# Patient Record
(demographics unavailable — no encounter records)

---

## 2025-01-22 NOTE — DISCUSSION/SUMMARY
[Patient] : the patient [___ Month(s)] : in [unfilled] month(s) [FreeTextEntry1] : 77 y/o male with h/o ao aneurysm, htn, hl, cirrhosis, cad, pancreatic ca s/p stent/whipple, prostate ca s/p prostatectomy who presents for f/up today  -EP referral for possible svt -Calcium score 5/24: Cardiac: 1. The calcium score is moderate at 265 Agatston units, which is at the 49 percentile, adjusted for age, gender and race. 2. Aortic and mitral annular calcification. Non-cardiac: Mild aneurysmal dilatation of the ascending aorta   -Echo 4/24: 1. Left ventricular cavity is normal in size. Left ventricular wall thickness is normal. Left ventricular systolic function is normal with an ejection fraction of 62 % by Way's method of disks. There are no regional wall motion abnormalities seen. 2. Normal left ventricular diastolic function. 3. Normal right ventricular cavity size, with normal wall thickness, and normal systolic function. 4. Aortic root at the sinuses of Valsalva is dilated, measuring 4.00 cm (indexed 2.23 cm/m). Ascending aorta diameter is dilated, measuring 3.83 cm (indexed 2.14 cm/m). 5. Trace tricuspid regurgitation. 6. No pericardial effusion seen. 7. No significant valvular disease.  -CT PE 2022: HEART/VASCULATURE: Mild coronary artery calcification. Normal heart size. No pericardial effusion. Mild aneurysmal dilatation of aortic root 4.3 cm and ascending aorta 4 cm. -ekg 9/24 - nsr, normal intervals, no st/t changes -refer to Dr. Schwartz for aneurysm -continue asa -on repatha  -unable to tolerate toprol for aneurysm -counseled on cvd risk factors -f/up 3 months for cvd risk factors, lipids, LpA  I have spent 30 minutes reviewing labs, records, tests and discussed cvd risk factors, cad, ao aneurysm.

## 2025-01-22 NOTE — HISTORY OF PRESENT ILLNESS
[FreeTextEntry1] : 77 y/o male with h/o ao aneurysm, htn, hl, cirrhosis, cad, pancreatic ca s/p stent/whipple, prostate ca s/p prostatectomy who presents for f/up today  last seen   was taking zetia - dc'd a few weeks started repatha 2 weeks ago   no cp no sob, maloney no syncope, lh, edema, orthponea, pnd    noted was in [place]  - played table tennis heart rate stayed in 120-125 range for an hour - saw this on watch (did not say afib on watch) sent to ER in [place] - reportedly normal ekg  noted retinal detachment right eye that same trip in [place] - had operation w 3 day stay  says has had elevated HR in past - but usually was able to take measures to slow it down - only lasted few minutes states he feels triggered by stress    returned from [place] 10 days ago     tried toprol - unable to tolerate  was on zetia 5 mg qhs  referred to EP - has not seen yet referred to [facility] for aneurysm - has not seen yet   -Calcium score : Cardiac: 1. The calcium score is moderate at 265 Agatston units, which is at the 49 percentile, adjusted for age, gender and race. 2. Aortic and mitral annular calcification. Non-cardiac: Mild aneurysmal dilatation of the ascending aorta  -Echo : 1. Left ventricular cavity is normal in size. Left ventricular wall thickness is normal. Left ventricular systolic function is normal with an ejection fraction of 62 % by Way's method of disks. There are no regional wall motion abnormalities seen. 2. Normal left ventricular diastolic function. 3. Normal right ventricular cavity size, with normal wall thickness, and normal systolic function. 4. Aortic root at the sinuses of Valsalva is dilated, measuring 4.00 cm (indexed 2.23 cm/m). Ascending aorta diameter is dilated, measuring 3.83 cm (indexed 2.14 cm/m). 5. Trace tricuspid regurgitation. 6. No pericardial effusion seen. 7. No significant valvular disease.     was on losartan in past  CT PE  HEART/VASCULATURE: Mild coronary artery calcification. Normal heart size. No pericardial effusion. Mild aneurysmal dilatation of aortic root 4.3 cm and ascending aorta 4 cm.   failed 3 statins - lipitor/crestor/zocor - fatigue, depression  active, plays table tennis mediterranean diet no mental health issues stress high sleep  PMH/PSH: cad meniere's ao aneurysm htn hearing loss cirrhosis gilberts gout sbo pancreatic ca s/p stent/whipple 2017 knee surgery hl inguinal hernia pulm nodule anxiety/depression arthritis tendon rupture BCC gerd pna prostate ca s/p prostatectomy  s/p appy s/p eye surgery s/p josr s/p bilateral shoulder surgery   ALL: cipro, flagyl, statins avelox, ketorolac  MEDS: pepcid zenpep Vit D2 asa 81 mg qd repatha  SH: no tobacco h/o heavy etoh past - no etoh now no drugs retired former  [employer]  no children from [place]  FH: mother -  82, h/o frontotemporal dementia father -  74, chf sister - alive, cva 65, 69 sister - ppm, alive 72

## 2025-02-26 NOTE — HISTORY OF PRESENT ILLNESS
[FreeTextEntry1] : 77 yo hx pancreatic CA s/p Whipple procedure , prostate CA s/p prostatectomy , inguinal hernia repair , > 4cm aortic aneurysm, R cochlear Meniere's Disease, HTN, HLD, Gout, bcc, and fatty liver discovered on imaging , referred by Dr. Valero, PCP for fatty liver and elevated LFTs. Fibroscan 23 F4, S3. f/u MRE 23: cirrhosis and severe steatosis. No evidence of hepatoma.  24 had detached retina in [place] in July, seeing Optho stopped reptha due to this , had read about optical issues assoc with the medicine notes he had "2 obstructive bowels" this year - had NG decompression - avoided surgery both time, related to ?adhesions ; notes the zenpep can be assoc with obstruction and gout, and he had episodes in the past  25: Since last visit, he has been doing well. Been eating well and exercise well. Does report he had the flu last week. But denies abd pain, N/V any more. Last week he did, but not his normal. last blood test was  it has gone up slowly from 35 -> 42 -> 48 . He had an US done which showed NO discrete lesions. Last colonoscopy was last year, polyps removed. Denies weight loss, changes to bowel habits. Does report he gets full quicker now since he stopped drinking.   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 LYMPH NODES: No thoracic lymphadenopathy. HEART/VASCULATURE: Mild coronary artery calcification. Normal heart size. No pericardial effusion. Mild aneurysmal dilatation of aortic root 4.3 cm and ascending aorta 4 cm. AIRWAYS/LUNGS/PLEURA: The airways are patent. No consolidations. Since  2017, no change in multiple pulmonary nodules including 6 mm in the right lower lobe 8 mm subpleural nodule right middle lobe and 8 mm subpleural nodule left upper lobe. No pleural effusions.  UPPER ABDOMEN: Hepatic steatosis. S/p gastric bypass. Status post pancreatic stent placement. BONES/SOFT TISSUES: Degenerative changes of the spine. Small hemangioma T11 vertebral body.  IMPRESSION: No pulmonary embolus. Mild aneurysmal dilatation of aortic root 4.3 cm and ascending aorta 4 cm.  ---prior history--- denies abdl pain, NV, f/c, CP, f/c appetite pretty good. Pt has been doing a low carb diet. Pt has cut down on wine intake to 1/2 a glass daily. BM daily, incomplete digestion, bm's mostly watery alt with soft, sometimes pale, mostly brown, denies rectal blood. walks

## 2025-02-26 NOTE — HISTORY OF PRESENT ILLNESS
[FreeTextEntry1] : 75 yo hx pancreatic CA s/p Whipple procedure , prostate CA s/p prostatectomy , inguinal hernia repair , > 4cm aortic aneurysm, R cochlear Meniere's Disease, HTN, HLD, Gout, bcc, and fatty liver discovered on imaging , referred by Dr. Valero, PCP for fatty liver and elevated LFTs. Fibroscan 23 F4, S3. f/u MRE 23: cirrhosis and severe steatosis. No evidence of hepatoma.  24 had detached retina in [place] in July, seeing Optho stopped reptha due to this , had read about optical issues assoc with the medicine notes he had "2 obstructive bowels" this year - had NG decompression - avoided surgery both time, related to ?adhesions ; notes the zenpep can be assoc with obstruction and gout, and he had episodes in the past  25: Since last visit, he has been doing well. Been eating well and exercise well. Does report he had the flu last week. But denies abd pain, N/V any more. Last week he did, but not his normal. last blood test was  it has gone up slowly from 35 -> 42 -> 48 . He had an US done which showed NO discrete lesions. Last colonoscopy was last year, polyps removed. Denies weight loss, changes to bowel habits. Does report he gets full quicker now since he stopped drinking.   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  Labs: 2024 - CBC - WBC 3.27, HgB 13.5, Plt 165 2024 - CMP - Na 137, K 4.2, Cl 98  6/15/23 (2022) AST 49 (54) ALT 53 (66) TB 1.7 (1.9) SCr 0.8 Hg 14.3   STUDIES CT ANGIO CHEST PULM ART [facility] 2022 FINDINGS: PULMONARY ANGIOGRAM: No pulmonary embolus. LYMPH NODES: No thoracic lymphadenopathy. HEART/VASCULATURE: Mild coronary artery calcification. Normal heart size. No pericardial effusion. Mild aneurysmal dilatation of aortic root 4.3 cm and ascending aorta 4 cm. AIRWAYS/LUNGS/PLEURA: The airways are patent. No consolidations. Since  2017, no change in multiple pulmonary nodules including 6 mm in the right lower lobe 8 mm subpleural nodule right middle lobe and 8 mm subpleural nodule left upper lobe. No pleural effusions.  UPPER ABDOMEN: Hepatic steatosis. S/p gastric bypass. Status post pancreatic stent placement. BONES/SOFT TISSUES: Degenerative changes of the spine. Small hemangioma T11 vertebral body.  IMPRESSION: No pulmonary embolus. Mild aneurysmal dilatation of aortic root 4.3 cm and ascending aorta 4 cm.  ---prior history--- denies abdl pain, NV, f/c, CP, f/c appetite pretty good. Pt has been doing a low carb diet. Pt has cut down on wine intake to 1/2 a glass daily. BM daily, incomplete digestion, bm's mostly watery alt with soft, sometimes pale, mostly brown, denies rectal blood. walks

## 2025-02-26 NOTE — ASSESSMENT
[FreeTextEntry1] : 77 yo hx pancreatic CA s/p Whipple procedure 2017, prostate CA s/p prostatectomy 2013, inguinal hernia repair 1977, > 4cm aortic aneurysm, R cochlear Meniere's Disease, HTN, HLD, Gout, bcc, initially referred for fatty liver discovered on imaging 2022, which led to diagnosis of Cirrhosis, suspect MASH / EtOH / Met-ALD  #Cirrhosis, MASH vs. MET-ALD vs. AARLD, MELD 3.0: 7 4/2024 - mr 5/2024 without HCC, US 2/25 neg HCC, AFP negative 8/2024 - repeat blood work today  - hcc screening  - low meld and well compensated - -HCC, -HE, ?EV, -SBP, -PVT, - Plan to repeat Fibroscan next visit, last one was 2023   #Hepatic duct Stent #Pancreatic Ca s/p Whipple #Elevated CA 19-9 - it appears stent has remained in place for 7 years, and after discussion with surgery team, this is acceptable for time being - Dilation of pancreatic duct mildly dilated measuring 0.5 cm - Repeat 19-9, if elevated further and MR negative, consider PET scan, or if changes in PD or increase in ca19-9 consider EGD/EUS to look for any obstructing lesion or new lesions   #Recurrent SBO - Followed by Dr. Nguyen in office, managed expectantly  RTC 3 months  Seen and dw Dr. Page.  Obey Mohamud DO, PhD Gastroenterology Fellow [facility]

## 2025-02-26 NOTE — ASSESSMENT
[FreeTextEntry1] : 77 yo hx pancreatic CA s/p Whipple procedure 2017, prostate CA s/p prostatectomy 2013, inguinal hernia repair 1977, > 4cm aortic aneurysm, R cochlear Meniere's Disease, HTN, HLD, Gout, bcc, initially referred for fatty liver discovered on imaging 2022, which led to diagnosis of Cirrhosis, suspect MASH / EtOH / Met-ALD  #Cirrhosis, MASH vs. MET-ALD vs. AARLD, MELD 3.0: 7 4/2024 - mr 5/2024 without HCC, US 2/25 neg HCC, AFP negative 8/2024 - repeat blood work today  - hcc screening  - low meld and well compensated - -HCC, -HE, ?EV, -SBP, -PVT, - Plan to repeat Fibroscan next visit, last one was 2023   #Hepatic duct Stent #Pancreatic Ca s/p Whipple #Elevated CA 19-9 - it appears stent has remained in place for 7 years, and after discussion with surgery team, this is acceptable for time being - Dilation of pancreatic duct mildly dilated measuring 0.5 cm - Repeat 19-9, if elevated further and MR negative, consider PET scan, or if changes in PD or increase in ca19-9 consider EGD/EUS to look for any obstructing lesion or new lesions   #Recurrent SBO - Followed by Dr. Nguyen in office, managed expectantly  RTC 3 months  Seen and dw Dr. Page.  Obey Mohamud DO, PhD Gastroenterology Fellow [facility]

## 2025-02-26 NOTE — PHYSICAL EXAM
[General Appearance - Alert] : alert [General Appearance - Well-Appearing] : healthy appearing [Sclera] : the sclera and conjunctiva were normal [Examination Of The Oral Cavity] : the lips and gums were normal [Neck Appearance] : the appearance of the neck was normal [Heart Rate And Rhythm] : heart rate was normal and rhythm regular [Edema] : there was no peripheral edema [Abnormal Walk] : normal gait [Skin Color & Pigmentation] : normal skin color and pigmentation [] : no rash [No Focal Deficits] : no focal deficits [Oriented To Time, Place, And Person] : oriented to person, place, and time [Abdomen Soft] : soft [Abdomen Tenderness] : non-tender [Abdomen Mass (___ Cm)] : no abdominal mass palpated [FreeTextEntry1] : prior scar noted, well healed

## 2025-03-04 NOTE — HISTORY OF PRESENT ILLNESS
[FreeTextEntry1] : 77 y/o male with h/o htn, hl, cirrhosis, cad, pancreatic ca s/p stent/whipple, prostate ca s/p prostatectomy, who presents for initial evaluation and management of thoracic aortic aneurysm. Patient is referred by Dr. Martha Duckworth.   CT ANGIO CHEST AORTA 02/10/2025 Since July 9, 2022, unchanged mild aneurysmal dilatation aortic root (4.0 cm).  TTE 04/10/2024 1. Left ventricular cavity is normal in size. Left ventricular wall thickness is normal. Left ventricular systolic function is normal with an ejection fraction of 62 % by Way's method of disks. There are no regional wall motion abnormalities seen. 2. Normal left ventricular diastolic function. 3. Normal right ventricular cavity size, with normal wall thickness, and normal systolic function. 4. Aortic root at the sinuses of Valsalva is dilated, measuring 4.00 cm (indexed 2.23 cm/m). Ascending aorta diameter is dilated, measuring 3.83 cm (indexed 2.14 cm/m). 5. Trace tricuspid regurgitation. 6. No pericardial effusion seen. 7. No significant valvular disease.

## 2025-03-04 NOTE — ASSESSMENT
[FreeTextEntry1] :   The patient's medical records and diagnostic images were reviewed at the time of this office visit, and the following recommendation was made. CT and TTE were completed of the thoracic aorta. The report was reviewed and noted in the chart, I independently reviewed and interpreted images and reports, and I reviewed the films/CD and agree. Patient does not meet criteria for surgical intervention at the time and will be entered into the aortic registry surveillance program.  Plan  - [facility] with CTA CHEST. - Continue medication regimen. - Follow up with cardiologist and PCP. - Blood pressure management. - Discussed signs and symptoms that warrant emergency medical attention.

## 2025-03-04 NOTE — HISTORY OF PRESENT ILLNESS
[FreeTextEntry1] : 75 y/o male with h/o htn, hl, cirrhosis, cad, pancreatic ca s/p stent/whipple, prostate ca s/p prostatectomy, who presents for initial evaluation and management of thoracic aortic aneurysm. Patient is referred by Dr. Martha Duckworth.   CT ANGIO CHEST AORTA 02/10/2025 Since July 9, 2022, unchanged mild aneurysmal dilatation aortic root (4.0 cm).  TTE 04/10/2024 1. Left ventricular cavity is normal in size. Left ventricular wall thickness is normal. Left ventricular systolic function is normal with an ejection fraction of 62 % by Way's method of disks. There are no regional wall motion abnormalities seen. 2. Normal left ventricular diastolic function. 3. Normal right ventricular cavity size, with normal wall thickness, and normal systolic function. 4. Aortic root at the sinuses of Valsalva is dilated, measuring 4.00 cm (indexed 2.23 cm/m). Ascending aorta diameter is dilated, measuring 3.83 cm (indexed 2.14 cm/m). 5. Trace tricuspid regurgitation. 6. No pericardial effusion seen. 7. No significant valvular disease.

## 2025-03-04 NOTE — END OF VISIT
[FreeTextEntry3] : I, JEREMIE Humphreys , personally performed the evaluation and management (E/M) services for this new patient.  That E/M includes conducting the clinically appropriate initial history &/or exam, assessing all conditions, and establishing the plan of care.  Today, my JEFFERSON, was here to observe &/or participate in the visit & follow plan of care established by me.

## 2025-03-04 NOTE — PROCEDURE
[FreeTextEntry1] : Dr. Schwartz reviewed the indications for surgery, and used our webpage [email or web address] <[email or web address]> to illustrate the aorta and anatomy of the heart. Those indications are the following: size greater than 5.0 cm, symptomatic aneurysms, family history of aortic dissection or aneurysm death with a size greater than 4.5 cm, other necessary cardiac procedures such as coronary artery bypass grafting or valvular disorders with an aneurysm greater than 4.5 cm, or connective tissue disorders with an aneurysm size greater than 4.5 cm. The patient does not meet size criteria for surgical intervention at the time.  Dr. Schwartz discussed activity restrictions with the patient, and would advise exercise at a moderate amount with no heavy lifting over one third of body weight, and avoiding heart rates that exceed 140 beats per minute. In addition, every patient should abstain from tobacco abuse and to avoid all illicit drug use, especially stimulants such as cocaine or methamphetamine. Dr. Schwartz also counseled regarding maintaining a healthy heart diet, and losing any excessive weight as this also put undue stress on both the aorta and entire cardiovascular system. First degree family members should be screened for bicuspid valve disease, and ascending aortic aneurysms.   Patient was advised to view the educational video prior to this visit regarding aortic pathology, risk factors, surgical procedures, and lifestyle modifications. Video can be retrieved at [email or web address].be.

## 2025-03-04 NOTE — PROCEDURE
[FreeTextEntry1] : Dr. Schwartz reviewed the indications for surgery, and used our webpage [email or web address] <[email or web address]> to illustrate the aorta and anatomy of the heart. Those indications are the following: size greater than 5.0 cm, symptomatic aneurysms, family history of aortic dissection or aneurysm death with a size greater than 4.5 cm, other necessary cardiac procedures such as coronary artery bypass grafting or valvular disorders with an aneurysm greater than 4.5 cm, or connective tissue disorders with an aneurysm size greater than 4.5 cm. The patient does not meet size criteria for surgical intervention at the time.  Dr. Schwartz discussed activity restrictions with the patient, and would advise exercise at a moderate amount with no heavy lifting over one third of body weight, and avoiding heart rates that exceed 140 beats per minute. In addition, every patient should abstain from tobacco abuse and to avoid all illicit drug use, especially stimulants such as cocaine or methamphetamine. Dr. Schwartz also counseled regarding maintaining a healthy heart diet, and losing any excessive weight as this also put undue stress on both the aorta and entire cardiovascular system. First degree family members should be screened for bicuspid valve disease, and ascending aortic aneurysms.   Patient was advised to view the educational video prior to this visit regarding aortic pathology, risk factors, surgical procedures, and lifestyle modifications. Video can be retrieved at [email or web address].

## 2025-03-04 NOTE — DATA REVIEWED
[FreeTextEntry1] : -Calcium score 5/24: Cardiac: 1. The calcium score is moderate at 265 Agatston units, which is at the 49 percentile, adjusted for age, gender and race. 2. Aortic and mitral annular calcification. Non-cardiac: Mild aneurysmal dilatation of the ascending aorta  -Echo 4/24: 1. Left ventricular cavity is normal in size. Left ventricular wall thickness is normal. Left ventricular systolic function is normal with an ejection fraction of 62 % by Way's method of disks. There are no regional wall motion abnormalities seen. 2. Normal left ventricular diastolic function. 3. Normal right ventricular cavity size, with normal wall thickness, and normal systolic function. 4. Aortic root at the sinuses of Valsalva is dilated, measuring 4.00 cm (indexed 2.23 cm/m). Ascending aorta diameter is dilated, measuring 3.83 cm (indexed 2.14 cm/m). 5. Trace tricuspid regurgitation. 6. No pericardial effusion seen. 7. No significant valvular disease.  CT PE 2022 HEART/VASCULATURE: Mild coronary artery calcification. Normal heart size. No pericardial effusion. Mild aneurysmal dilatation of aortic root 4.3 cm and ascending aorta 4 cm.

## 2025-03-04 NOTE — PROCEDURE
[FreeTextEntry1] : Dr. Schwartz reviewed the indications for surgery, and used our webpage [email or web address] <[email or web address]> to illustrate the aorta and anatomy of the heart. Those indications are the following: size greater than 5.0 cm, symptomatic aneurysms, family history of aortic dissection or aneurysm death with a size greater than 4.5 cm, other necessary cardiac procedures such as coronary artery bypass grafting or valvular disorders with an aneurysm greater than 4.5 cm, or connective tissue disorders with an aneurysm size greater than 4.5 cm. The patient does not meet size criteria for surgical intervention at the time.  Dr. Schwartz discussed activity restrictions with the patient, and would advise exercise at a moderate amount with no heavy lifting over one third of body weight, and avoiding heart rates that exceed 140 beats per minute. In addition, every patient should abstain from tobacco abuse and to avoid all illicit drug use, especially stimulants such as cocaine or methamphetamine. Dr. Schwartz also counseled regarding maintaining a healthy heart diet, and losing any excessive weight as this also put undue stress on both the aorta and entire cardiovascular system. First degree family members should be screened for bicuspid valve disease, and ascending aortic aneurysms.   Patient was advised to view the educational video prior to this visit regarding aortic pathology, risk factors, surgical procedures, and lifestyle modifications. Video can be retrieved at [email or web address].

## 2025-04-21 NOTE — CARDIOLOGY SUMMARY
[de-identified] : 4/21/25 SB @ 58 bpm  [de-identified] : TTE 4/2024 1. Left ventricular cavity is normal in size. Left ventricular wall thickness is normal. Left ventricular systolic function is normal with an ejection fraction of 62 % by Way's method of disks. There are no regional wall motion abnormalities seen. 2. Normal left ventricular diastolic function. 3. Normal right ventricular cavity size, with normal wall thickness, and normal systolic function. 4. Aortic root at the sinuses of Valsalva is dilated, measuring 4.00 cm (indexed 2.23 cm/m). Ascending aorta diameter is dilated, measuring 3.83 cm (indexed 2.14 cm/m). 5. Trace tricuspid regurgitation. 6. No pericardial effusion seen. 7. No significant valvular disease.

## 2025-04-21 NOTE — CARDIOLOGY SUMMARY
[de-identified] : 4/21/25 SB @ 58 bpm  [de-identified] : TTE 4/2024 1. Left ventricular cavity is normal in size. Left ventricular wall thickness is normal. Left ventricular systolic function is normal with an ejection fraction of 62 % by Way's method of disks. There are no regional wall motion abnormalities seen. 2. Normal left ventricular diastolic function. 3. Normal right ventricular cavity size, with normal wall thickness, and normal systolic function. 4. Aortic root at the sinuses of Valsalva is dilated, measuring 4.00 cm (indexed 2.23 cm/m). Ascending aorta diameter is dilated, measuring 3.83 cm (indexed 2.14 cm/m). 5. Trace tricuspid regurgitation. 6. No pericardial effusion seen. 7. No significant valvular disease.

## 2025-04-21 NOTE — ADDENDUM
[FreeTextEntry1] : I, Sonia Parikh, am scribing for and the presence of Dr. Brumfield the following sections: HPI, PMH,Family/social history, ROS, Physical Exam, Assessment / Plan.   I, Audie Brumfield, personally performed the services described in the documentation, reviewed the documentation recorded by the scribe in my presence and it accurately and completely records my words and actions.

## 2025-04-21 NOTE — HISTORY OF PRESENT ILLNESS
[FreeTextEntry1] : Mr. Rolle is a pleasant 76 year old gentleman with a past medical history significant for thoracic aortic aneurysm (followed by Dr. Schwartz), HTN, HLD, Cirrhosis, CAD, Retinal detachment (2024), Pancreatic CA s/p stent/whipple, Prostate CA s/p prostatectomy who presents for evaluation of tachycardia.    He notes a longstanding history of elevated heartrate / palpitations during stressful situations.  However, he had a prolonged episode after a table tennis competition in [place] during Summer 2024.  Reports the episode lasting for ~ 2 hours with HR in the 120 bpm range based upon apple watch.  He had an EKG at an ER in [place] that showed normal rhythm by his report.   He has not received any alerts for AFib.  He has had other frequent brief episodes that start abruptly and he is able to control with meditative breathing.  Often triggered by stress.  He was an avid runner for many years- ran [place] four times.  Since then he has been very active playing competetive table tennis.

## 2025-04-21 NOTE — DISCUSSION/SUMMARY
[FreeTextEntry1] : Mr. Rolle is a pleasant 76 year old gentleman with a past medical history significant for thoracic aortic aneurysm (followed by Dr. Schwartz), HTN, HLD, Cirrhosis, CAD, Retinal detachment (2024), Pancreatic CA s/p stent/whipple, Prostate CA s/p prostatectomy who presents for evaluation of tachycardia.    1.  Tachycardia Discussed concern for SVT vs. AFib given h/o marathon training  Recommend LTM for heart rhythm assessment   F/U 6 weeks, sooner as needed

## 2025-04-21 NOTE — HISTORY OF PRESENT ILLNESS
[FreeTextEntry1] : Mr. Rolle is a pleasant 76 year old gentleman with a past medical history significant for thoracic aortic aneurysm (followed by Dr. Schwartz), HTN, HLD, Cirrhosis, CAD, Retinal detachment (2024), Pancreatic CA s/p stent/whipple, Prostate CA s/p prostatectomy who presents for evaluation of tachycardia.    He notes a longstanding history of elevated heartrate / palpitations during stressful situations.  However, he had a prolonged episode after a table tennis competition in [place] during Summer 2024.  Reports the episode lasting for ~ 2 hours with HR in the 120 bpm range based upon apple watch.  He had an EKG at an ER in [place] that showed normal rhythm by his report.   He has not received any alerts for AFib.  He has had other frequent brief episodes that start abruptly and he is able to control with meditative breathing.  Often triggered by stress.  He was an avid runner for many years- ran [place] four times.  Since then he has been very active playing competetive table tennis.

## 2025-04-21 NOTE — CARDIOLOGY SUMMARY
[de-identified] : 4/21/25 SB @ 58 bpm  [de-identified] : TTE 4/2024 1. Left ventricular cavity is normal in size. Left ventricular wall thickness is normal. Left ventricular systolic function is normal with an ejection fraction of 62 % by Way's method of disks. There are no regional wall motion abnormalities seen. 2. Normal left ventricular diastolic function. 3. Normal right ventricular cavity size, with normal wall thickness, and normal systolic function. 4. Aortic root at the sinuses of Valsalva is dilated, measuring 4.00 cm (indexed 2.23 cm/m). Ascending aorta diameter is dilated, measuring 3.83 cm (indexed 2.14 cm/m). 5. Trace tricuspid regurgitation. 6. No pericardial effusion seen. 7. No significant valvular disease.

## 2025-04-21 NOTE — HISTORY OF PRESENT ILLNESS
[FreeTextEntry1] : Mr. Rolle is a pleasant 76 year old gentleman with a past medical history significant for thoracic aortic aneurysm (followed by Dr. Schwartz), HTN, HLD, Cirrhosis, CAD, Retinal detachment (2024), Pancreatic CA s/p stent/whipple, Prostate CA s/p prostatectomy who presents for evaluation of tachycardia.    He notes a longstanding history of elevated heartrate / palpitations during stressful situations.  However, he had a prolonged episode after a table tennis competition in [place] during Summer 2024.  Reports the episode lasting for ~ 2 hours with HR in the 120 bpm range based upon apple watch.  He had an EKG at an ER in [place] that showed normal rhythm by his report.   He has not received any alerts for AFib.  He has had other frequent brief episodes that start abruptly and he is able to control with meditative breathing.  Often triggered by stress.  He was an avid runner for many years- ran [place] four times.  Since then he has been very active playing competetive table tennis.

## 2025-04-22 NOTE — HISTORY OF PRESENT ILLNESS
[FreeTextEntry1] : 77 y/o male with h/o ao aneurysm, htn, hl, cirrhosis, cad, pancreatic ca s/p stent/whipple, prostate ca s/p prostatectomy who presents for f/up today  last seen   on repatha  seen by Efren seen by EP - getting LTM  tried zetia in past  continues to note intermittent palpitations no cp no sob, maloney no syncope, lh, edema, orthponea, pnd    noted was in [place]  - played table tennis heart rate stayed in 120-125 range for an hour - saw this on watch (did not say afib on watch) sent to ER in [place] - reportedly normal ekg  noted retinal detachment right eye that same trip in [place] hospitalized - had operation w 3 day stay  says has had elevated HR in past - but usually was able to take measures to slow it down - only lasted few minutes states he feels triggered by stress   returned from [place] 10 days ago    tried toprol - unable to tolerate  was on zetia 5 mg qhs  referred to EP - has not seen yet referred to Efren for aneurysm - has not seen yet   -Calcium score : Cardiac: 1. The calcium score is moderate at 265 Agatston units, which is at the 49 percentile, adjusted for age, gender and race. 2. Aortic and mitral annular calcification. Non-cardiac: Mild aneurysmal dilatation of the ascending aorta  -Echo : 1. Left ventricular cavity is normal in size. Left ventricular wall thickness is normal. Left ventricular systolic function is normal with an ejection fraction of 62 % by Way's method of disks. There are no regional wall motion abnormalities seen. 2. Normal left ventricular diastolic function. 3. Normal right ventricular cavity size, with normal wall thickness, and normal systolic function. 4. Aortic root at the sinuses of Valsalva is dilated, measuring 4.00 cm (indexed 2.23 cm/m). Ascending aorta diameter is dilated, measuring 3.83 cm (indexed 2.14 cm/m). 5. Trace tricuspid regurgitation. 6. No pericardial effusion seen. 7. No significant valvular disease.     was on losartan in past  CT PE  HEART/VASCULATURE: Mild coronary artery calcification. Normal heart size. No pericardial effusion. Mild aneurysmal dilatation of aortic root 4.3 cm and ascending aorta 4 cm.   failed 3 statins - lipitor/crestor/zocor - fatigue, depression  active, plays table tennis mediterranean diet no mental health issues stress high sleep  PMH/PSH: cad meniere's ao aneurysm htn hearing loss cirrhosis gilberts gout sbo pancreatic ca s/p stent/whipple 2017 knee surgery hl inguinal hernia pulm nodule anxiety/depression arthritis tendon rupture BCC gerd pna prostate ca s/p prostatectomy  s/p appy s/p eye surgery s/p josr s/p bilateral shoulder surgery   ALL: cipro, flagyl, statins avelox, ketorolac  MEDS: pepcid zenpep Vit D2 asa 81 mg qd repatha  SH: no tobacco h/o heavy etoh past - no etoh now no drugs retired former  [employer]  no children from [place]  FH: mother -  82, h/o frontotemporal dementia father -  74, chf sister - alive, cva 65, 69 sister - ppm, alive 72

## 2025-04-22 NOTE — DISCUSSION/SUMMARY
[Patient] : the patient [___ Month(s)] : in [unfilled] month(s) [FreeTextEntry1] : 75 y/o male with h/o ao aneurysm, htn, hl, cirrhosis, cad, pancreatic ca s/p stent/whipple, prostate ca s/p prostatectomy who presents for f/up today  -EP f/up for possible SVT - getting LTM -Calcium score 5/24: Cardiac: 1. The calcium score is moderate at 265 Agatston units, which is at the 49 percentile, adjusted for age, gender and race. 2. Aortic and mitral annular calcification. Non-cardiac: Mild aneurysmal dilatation of the ascending aorta  -Echo 4/24: 1. Left ventricular cavity is normal in size. Left ventricular wall thickness is normal. Left ventricular systolic function is normal with an ejection fraction of 62 % by Way's method of disks. There are no regional wall motion abnormalities seen. 2. Normal left ventricular diastolic function. 3. Normal right ventricular cavity size, with normal wall thickness, and normal systolic function. 4. Aortic root at the sinuses of Valsalva is dilated, measuring 4.00 cm (indexed 2.23 cm/m). Ascending aorta diameter is dilated, measuring 3.83 cm (indexed 2.14 cm/m). 5. Trace tricuspid regurgitation. 6. No pericardial effusion seen. 7. No significant valvular disease.  -labs 2025 reviewed - ordered bmp, lipids, A1c, LpA  -CT PE 2022: HEART/VASCULATURE: Mild coronary artery calcification. Normal heart size. No pericardial effusion. Mild aneurysmal dilatation of aortic root 4.3 cm and ascending aorta 4 cm. -ekg 4/25 reviewed  -ekg 9/24 - nsr, normal intervals, no st/t changes -f/up with Dr. Schwartz for aneurysm- to get CTA aorta 2/25 -continue asa -on repatha -unable to tolerate toprol for aneurysm -counseled on cvd risk factors -f/up 6 months for cvd risk factors   I have spent 30 minutes reviewing labs, records, tests and discussed cvd risk factors, cad, ao aneurysm.

## 2025-06-23 NOTE — DISCUSSION/SUMMARY
[FreeTextEntry1] : Mr. Rolle is a pleasant 77 year old gentleman with a past medical history significant for thoracic aortic aneurysm (followed by Dr. Schwartz), HTN, HLD, Cirrhosis, CAD, Retinal detachment (2024), Pancreatic CA s/p stent/whipple, Prostate CA s/p prostatectomy and paroxysmal SVT   1.  SVT Recent LTM with narrow complex tachycardia -- sustained episodes up to 29 minutes duration.  No evidence of atrial fibrillation  We discussed the normal conduction system of the heart and the various types of SVT. Options for management were discussed, including observation, vagal maneuvers, medical therapy and catheter based ablation. We discussed risks of ablation including, but not limited to, infection, vascular injury, cardiac perforation, injury to intrinsic conduction system necessitating PPM placement or need for emergent surgery.  After consideration of this information, the decision was made to proceed with EP study +/- ablation. Mr. Rolle appeared to understand the whole discussion and verbalized that all his questions were answered to his satisfaction.   Advised to start Metoprolol XL 25 mg daily for rate control in the interim.  Advised to hold 3 days prior to EPS.  He was given pre procedure instructions and knows to call with any questions or concerns.

## 2025-06-23 NOTE — HISTORY OF PRESENT ILLNESS
[FreeTextEntry1] : Mr. Rolle is a pleasant 77 year old gentleman with a past medical history significant for thoracic aortic aneurysm (followed by Dr. Schwartz), HTN, HLD, Cirrhosis, CAD, Retinal detachment (2024), Pancreatic CA s/p stent/whipple, Prostate CA s/p prostatectomy and tachycardia who presents for follow up.      He notes a longstanding history of elevated heartrate / palpitations during stressful situations.  However, he had a prolonged episode after a table tennis competition in [place] during Summer 2024.  Reports the episode lasting for ~ 2 hours with HR in the 120 bpm range based upon apple watch.  He had an EKG at an ER in [place] that showed normal rhythm by his report.   He has not received any alerts for AFib.  He has had other frequent brief episodes that start abruptly and he is able to control with meditative breathing.  Often triggered by stress.  He continues to experience these episodes, including while wearing recent long-term monitor that showed paroxysmal SVT.    He was an avid runner for many years- ran [place] four times.  Since then he has been very active playing competitive table tennis.

## 2025-06-23 NOTE — CARDIOLOGY SUMMARY
[de-identified] : 6/23/25 SR @ 66 bpm  4/21/25 SB @ 58 bpm  [de-identified] : ZioXT 5/5/ - 5/19/25: SR (40-), NSVT, sustained SVT-- longest episode 29 minutes with  bpm, fastest episode 20 min with max rate of 176  [de-identified] : TTE 4/2024 1. Left ventricular cavity is normal in size. Left ventricular wall thickness is normal. Left ventricular systolic function is normal with an ejection fraction of 62 % by Way's method of disks. There are no regional wall motion abnormalities seen. 2. Normal left ventricular diastolic function. 3. Normal right ventricular cavity size, with normal wall thickness, and normal systolic function. 4. Aortic root at the sinuses of Valsalva is dilated, measuring 4.00 cm (indexed 2.23 cm/m). Ascending aorta diameter is dilated, measuring 3.83 cm (indexed 2.14 cm/m). 5. Trace tricuspid regurgitation. 6. No pericardial effusion seen. 7. No significant valvular disease.

## 2025-06-23 NOTE — CARDIOLOGY SUMMARY
[de-identified] : 6/23/25 SR @ 66 bpm  4/21/25 SB @ 58 bpm  [de-identified] : ZioXT 5/5/ - 5/19/25: SR (40-), NSVT, sustained SVT-- longest episode 29 minutes with  bpm, fastest episode 20 min with max rate of 176  [de-identified] : TTE 4/2024 1. Left ventricular cavity is normal in size. Left ventricular wall thickness is normal. Left ventricular systolic function is normal with an ejection fraction of 62 % by Way's method of disks. There are no regional wall motion abnormalities seen. 2. Normal left ventricular diastolic function. 3. Normal right ventricular cavity size, with normal wall thickness, and normal systolic function. 4. Aortic root at the sinuses of Valsalva is dilated, measuring 4.00 cm (indexed 2.23 cm/m). Ascending aorta diameter is dilated, measuring 3.83 cm (indexed 2.14 cm/m). 5. Trace tricuspid regurgitation. 6. No pericardial effusion seen. 7. No significant valvular disease.

## 2025-06-23 NOTE — CARDIOLOGY SUMMARY
[de-identified] : 6/23/25 SR @ 66 bpm  4/21/25 SB @ 58 bpm  [de-identified] : ZioXT 5/5/ - 5/19/25: SR (40-), NSVT, sustained SVT-- longest episode 29 minutes with  bpm, fastest episode 20 min with max rate of 176  [de-identified] : TTE 4/2024 1. Left ventricular cavity is normal in size. Left ventricular wall thickness is normal. Left ventricular systolic function is normal with an ejection fraction of 62 % by Way's method of disks. There are no regional wall motion abnormalities seen. 2. Normal left ventricular diastolic function. 3. Normal right ventricular cavity size, with normal wall thickness, and normal systolic function. 4. Aortic root at the sinuses of Valsalva is dilated, measuring 4.00 cm (indexed 2.23 cm/m). Ascending aorta diameter is dilated, measuring 3.83 cm (indexed 2.14 cm/m). 5. Trace tricuspid regurgitation. 6. No pericardial effusion seen. 7. No significant valvular disease.

## 2025-06-23 NOTE — HISTORY OF PRESENT ILLNESS
[FreeTextEntry1] : Mr. Rolle is a pleasant 77 year old gentleman with a past medical history significant for thoracic aortic aneurysm (followed by Dr. Schwartz), HTN, HLD, Cirrhosis, CAD, Retinal detachment (2024), Pancreatic CA s/p stent/whipple, Prostate CA s/p prostatectomy and tachycardia who presents for follow up.      He notes a longstanding history of elevated heartrate / palpitations during stressful situations.  However, he had a prolonged episode after a table tennis competition in [place] during Summer 2024.  Reports the episode lasting for ~ 2 hours with HR in the 120 bpm range based upon apple watch.  He had an EKG at an ER in [place] that showed normal rhythm by his report.   He has not received any alerts for AFib.  He has had other frequent brief episodes that start abruptly and he is able to control with meditative breathing.  Often triggered by stress.  He continues to experience these episodes, including while wearing recent long-term monitor that showed paroxysmal SVT.    He was an avid runner for many years- ran [place] four times.  Since then he has been very active playing competitive table tennis.

## 2025-06-23 NOTE — HISTORY OF PRESENT ILLNESS
[FreeTextEntry1] : Mr. Rolle is a pleasant 77 year old gentleman with a past medical history significant for thoracic aortic aneurysm (followed by Dr. Schwartz), HTN, HLD, Cirrhosis, CAD, Retinal detachment (2024), Pancreatic CA s/p stent/whipple, Prostate CA s/p prostatectomy and tachycardia who presents for follow up.      He notes a longstanding history of elevated heartrate / palpitations during stressful situations.  However, he had a prolonged episode after a table tennis competition in [place] during Summer 2024.  Reports the episode lasting for ~ 2 hours with HR in the 120 bpm range based upon apple watch.  He had an EKG at an ER in [place] that showed normal rhythm by his report.   He has not received any alerts for AFib.  He has had other frequent brief episodes that start abruptly and he is able to control with meditative breathing.  Often triggered by stress.  He continues to experience these episodes, including while wearing recent long-term monitor that showed paroxysmal SVT.    He was an avid runner for many years- ran [place] four times.  Since then he has been very active playing competitive table tennis.